# Patient Record
Sex: FEMALE | Race: WHITE | NOT HISPANIC OR LATINO | Employment: PART TIME | URBAN - METROPOLITAN AREA
[De-identification: names, ages, dates, MRNs, and addresses within clinical notes are randomized per-mention and may not be internally consistent; named-entity substitution may affect disease eponyms.]

---

## 2017-11-21 ENCOUNTER — OFFICE VISIT (OUTPATIENT)
Dept: ORTHOPEDIC SURGERY | Facility: CLINIC | Age: 52
End: 2017-11-21

## 2017-11-21 VITALS
WEIGHT: 248 LBS | SYSTOLIC BLOOD PRESSURE: 154 MMHG | BODY MASS INDEX: 42.34 KG/M2 | HEART RATE: 85 BPM | HEIGHT: 64 IN | DIASTOLIC BLOOD PRESSURE: 98 MMHG

## 2017-11-21 DIAGNOSIS — S83.231A COMPLEX TEAR OF MEDIAL MENISCUS OF RIGHT KNEE AS CURRENT INJURY, INITIAL ENCOUNTER: Primary | ICD-10-CM

## 2017-11-21 PROCEDURE — 99204 OFFICE O/P NEW MOD 45 MIN: CPT | Performed by: ORTHOPAEDIC SURGERY

## 2017-11-21 RX ORDER — MONTELUKAST SODIUM 10 MG/1
TABLET ORAL
COMMUNITY
Start: 2017-09-19

## 2017-11-21 RX ORDER — ALBUTEROL SULFATE 2.5 MG/3ML
SOLUTION RESPIRATORY (INHALATION)
COMMUNITY
Start: 2017-09-05

## 2017-11-21 RX ORDER — ALBUTEROL SULFATE 90 UG/1
AEROSOL, METERED RESPIRATORY (INHALATION)
COMMUNITY
Start: 2017-08-31

## 2017-11-21 RX ORDER — CETIRIZINE HYDROCHLORIDE 10 MG/1
TABLET ORAL
COMMUNITY
Start: 2017-09-19

## 2017-12-14 ENCOUNTER — OUTSIDE FACILITY SERVICE (OUTPATIENT)
Dept: ORTHOPEDIC SURGERY | Facility: CLINIC | Age: 52
End: 2017-12-14

## 2017-12-14 PROCEDURE — 29881 ARTHRS KNE SRG MNISECTMY M/L: CPT | Performed by: ORTHOPAEDIC SURGERY

## 2017-12-20 ENCOUNTER — OFFICE VISIT (OUTPATIENT)
Dept: ORTHOPEDIC SURGERY | Facility: CLINIC | Age: 52
End: 2017-12-20

## 2017-12-20 DIAGNOSIS — Z98.890 STATUS POST ARTHROSCOPY OF RIGHT KNEE: ICD-10-CM

## 2017-12-20 DIAGNOSIS — S83.231D COMPLEX TEAR OF MEDIAL MENISCUS OF RIGHT KNEE AS CURRENT INJURY, SUBSEQUENT ENCOUNTER: Primary | ICD-10-CM

## 2017-12-20 PROCEDURE — 99024 POSTOP FOLLOW-UP VISIT: CPT | Performed by: ORTHOPAEDIC SURGERY

## 2017-12-20 NOTE — PROGRESS NOTES
Chief Complaint   Patient presents with   • Post-op     1 week - 12/14/17 Rt knee arthroscopy & partial medial meniscectomy           HPI  She is doing well with no complaints.      There were no vitals filed for this visit.      Physical Exam:    She walks with antalgic gait on crutches.  Range of motion 20-90°.  She has a negative Homans sign.  She is neurovascularly intact        Denise was seen today for post-op.    Diagnoses and all orders for this visit:    Complex tear of medial meniscus of right knee as current injury, subsequent encounter  -     Ambulatory Referral to Physical Therapy    Status post arthroscopy of right knee  -     Ambulatory Referral to Physical Therapy      She will go to physical therapy and follow-up in 3 weeks

## 2018-01-10 ENCOUNTER — OFFICE VISIT (OUTPATIENT)
Dept: ORTHOPEDIC SURGERY | Facility: CLINIC | Age: 53
End: 2018-01-10

## 2018-01-10 DIAGNOSIS — Z98.890 STATUS POST ARTHROSCOPY OF RIGHT KNEE: Primary | ICD-10-CM

## 2018-01-10 DIAGNOSIS — S83.231D COMPLEX TEAR OF MEDIAL MENISCUS OF RIGHT KNEE AS CURRENT INJURY, SUBSEQUENT ENCOUNTER: ICD-10-CM

## 2018-01-10 PROCEDURE — 99024 POSTOP FOLLOW-UP VISIT: CPT | Performed by: ORTHOPAEDIC SURGERY

## 2018-01-10 NOTE — PROGRESS NOTES
Chief Complaint   Patient presents with   • Post-op Follow-up     4 weeks 12/14/17 Rt knee arthroscopy & partial medial meniscectomy           HPI    She's doing well no complaints    There were no vitals filed for this visit.      Physical Exam:    Her right knee looks good portals look good.  Negative Homans sign.        Denise was seen today for post-op follow-up.    Diagnoses and all orders for this visit:    Status post arthroscopy of right knee  -     Ambulatory Referral to Physical Therapy    Complex tear of medial meniscus of right knee as current injury, subsequent encounter  -     Ambulatory Referral to Physical Therapy      She's doing well and will follow up as needed

## 2024-09-04 ENCOUNTER — OFFICE VISIT (OUTPATIENT)
Dept: CARDIOLOGY | Facility: CLINIC | Age: 59
End: 2024-09-04
Payer: MEDICAID

## 2024-09-04 VITALS
BODY MASS INDEX: 45.98 KG/M2 | SYSTOLIC BLOOD PRESSURE: 145 MMHG | HEIGHT: 65 IN | DIASTOLIC BLOOD PRESSURE: 84 MMHG | WEIGHT: 276 LBS | OXYGEN SATURATION: 97 % | HEART RATE: 91 BPM

## 2024-09-04 DIAGNOSIS — R06.02 SHORTNESS OF BREATH: ICD-10-CM

## 2024-09-04 DIAGNOSIS — R42 DIZZINESS: Primary | ICD-10-CM

## 2024-09-04 DIAGNOSIS — R55 PRE-SYNCOPE: ICD-10-CM

## 2024-09-04 PROCEDURE — 99204 OFFICE O/P NEW MOD 45 MIN: CPT | Performed by: PHYSICIAN ASSISTANT

## 2024-09-04 PROCEDURE — 1159F MED LIST DOCD IN RCRD: CPT | Performed by: PHYSICIAN ASSISTANT

## 2024-09-04 PROCEDURE — 1160F RVW MEDS BY RX/DR IN RCRD: CPT | Performed by: PHYSICIAN ASSISTANT

## 2024-09-04 RX ORDER — EPINEPHRINE 0.3 MG/.3ML
0.3 INJECTION SUBCUTANEOUS ONCE
COMMUNITY

## 2024-09-04 NOTE — PROGRESS NOTES
Subjective   Denise Vasques is a 58 y.o. female     Chief Complaint   Patient presents with    [facility]     Cardiac eval - patient reports dizziness, onset was 6/29/24 after flying down to [place], she initially thought it was due to overexertion, had CT And MRI per PCP that showed sinusitis. She reports had gotten new c-pap machine and after wearing it the dizziness resolved. Patient reports was adopted and does not know her family history.     Shortness of Breath     With activity       HPI  The patient presents in the clinic today to establish cardiovascular care.  This very pleasant patient is referred primarily because of dizziness/presyncope.  She has had dizziness for the past 2 to 3 months now.  She reports that this is typically more of a sensation of motion, describing basically vertigo today.  She reports that this tends to be positional, worse when lying flat or with certain motions.  She did see her primary care provider for complaints.  CT and MRI have been performed.  The studies were mostly benign for any significant pathology.  She did apparently had change in CPAP therapy, and reports that now her dizziness has all resolved.  This is followed closely by her pulmonology team.  From general cardiovascular standpoint, she denies chest pain.  Dyspnea is at baseline.  She has rare palpitations but no sustained dysrhythmic activity.  She has no further complaints and feels that she is doing well.  Of note, she has no limitation from cardiovascular standpoint even at moderate levels of exertion.      Current Outpatient Medications   Medication Sig Dispense Refill    cetirizine (zyrTEC) 10 MG tablet Take 1 tablet by mouth Daily As Needed for Allergies.      EPINEPHrine (EpiPen 2-Chance) 0.3 MG/0.3ML solution auto-injector injection Inject 0.3 mL under the skin into the appropriate area as directed 1 (One) Time.      VENTOLIN  (90 Base) MCG/ACT inhaler        No current facility-administered  "medications for this visit.       Alpha-gal and Penicillins    Past Medical History:   Diagnosis Date    Allergy to alpha-gal     Asthma     Sleep apnea        Social History     Socioeconomic History    Marital status:    Tobacco Use    Smoking status: Never    Smokeless tobacco: Never   Substance and Sexual Activity    Alcohol use: Yes     Alcohol/week: 2.0 standard drinks of alcohol     Types: 1 Shots of liquor, 1 Drinks containing 0.5 oz of alcohol per week     Comment: occasional    Drug use: No    Sexual activity: Yes     Partners: Male     Birth control/protection: Hysterectomy       Family History   Problem Relation Age of Onset    No Known Problems Mother     No Known Problems Father     Cancer Sister        Review of Systems   Constitutional:  Positive for diaphoresis. Negative for chills, fatigue and fever.   Eyes: Negative.  Negative for visual disturbance.   Respiratory:  Positive for apnea and shortness of breath. Negative for cough, chest tightness and wheezing.    Cardiovascular:  Positive for leg swelling. Negative for chest pain and palpitations.   Gastrointestinal:  Negative for abdominal pain and blood in stool.   Endocrine: Negative.    Genitourinary: Negative.  Negative for hematuria.   Musculoskeletal:  Positive for back pain. Negative for arthralgias, myalgias, neck pain and neck stiffness.   Skin: Negative.  Negative for rash and wound.   Allergic/Immunologic: Positive for environmental allergies (cats, pollen , grass, trees) and food allergies (alpha gal).   Neurological:  Positive for dizziness and headaches. Negative for syncope, weakness, light-headedness and numbness.   Hematological: Negative.  Does not bruise/bleed easily.   Psychiatric/Behavioral:  Positive for sleep disturbance (sleep apnea).        Objective     Vitals:    09/04/24 1436   BP: 145/84   Pulse: 91   SpO2: 97%   Weight: 125 kg (276 lb)   Height: 165.1 cm (65\")        /84   Pulse 91   Ht 165.1 cm (65\")   " Wt 125 kg (276 lb)   SpO2 97%   BMI 45.93 kg/m²      Lab Results (most recent)       None            Physical Exam  Vitals and nursing note reviewed.   Constitutional:       General: She is not in acute distress.     Appearance: She is well-developed.   HENT:      Head: Normocephalic and atraumatic.   Eyes:      Conjunctiva/sclera: Conjunctivae normal.      Pupils: Pupils are equal, round, and reactive to light.   Neck:      Vascular: No JVD.      Trachea: No tracheal deviation.   Cardiovascular:      Rate and Rhythm: Normal rate and regular rhythm.      Heart sounds: Normal heart sounds.   Pulmonary:      Effort: Pulmonary effort is normal.      Breath sounds: Normal breath sounds.   Abdominal:      General: Bowel sounds are normal. There is no distension.      Palpations: Abdomen is soft. There is no mass.      Tenderness: There is no abdominal tenderness. There is no guarding or rebound.   Musculoskeletal:         General: No tenderness or deformity. Normal range of motion.      Cervical back: Normal range of motion and neck supple.   Skin:     General: Skin is warm and dry.      Coloration: Skin is not pale.      Findings: No erythema or rash.   Neurological:      Mental Status: She is alert and oriented to person, place, and time.   Psychiatric:         Behavior: Behavior normal.         Thought Content: Thought content normal.         Judgment: Judgment normal.         Procedure   Procedures         Assessment & Plan      Diagnosis Plan   1. Dizziness  Adult Transthoracic Echo Complete W/ Cont if Necessary Per Protocol      2. Shortness of breath  Adult Transthoracic Echo Complete W/ Cont if Necessary Per Protocol      3. Pre-syncope  Adult Transthoracic Echo Complete W/ Cont if Necessary Per Protocol        1.  The patient presents primarily for evaluation of symptoms.  She is most concerned with dizziness and history of presyncope.  I do not feel this is likely cardiac related.  She describes mostly  vertigo.  Still, cardiac evaluation has been recommended.    2.  We will schedule for an echo just to evaluate cardiac structure.  If normal, I would not feel further evaluation would be indicated.    3.  If symptoms were to persist, I would consider event monitor and further evaluation otherwise.  She now reports that she is mostly asymptomatic.  We will monitor clinical course and correlate echo findings accordingly.    4.  No adjustments of medications will be made.  If echo findings are unremarkable, we can see the patient back as needed.                 Electronically signed by:

## 2024-10-09 ENCOUNTER — HOSPITAL ENCOUNTER (OUTPATIENT)
Dept: CARDIOLOGY | Facility: HOSPITAL | Age: 59
Discharge: HOME | End: 2024-10-09
Admitting: PHYSICIAN ASSISTANT
Payer: MEDICAID

## 2024-10-09 VITALS — BODY MASS INDEX: 45.91 KG/M2 | WEIGHT: 275.57 LBS | HEIGHT: 65 IN

## 2024-10-09 DIAGNOSIS — R06.02 SHORTNESS OF BREATH: ICD-10-CM

## 2024-10-09 DIAGNOSIS — R42 DIZZINESS: ICD-10-CM

## 2024-10-09 DIAGNOSIS — R55 PRE-SYNCOPE: ICD-10-CM

## 2024-10-09 PROCEDURE — 93306 TTE W/DOPPLER COMPLETE: CPT

## 2024-10-13 LAB
AORTIC DIMENSIONLESS INDEX: 0.77 (DI)
BH CV ECHO MEAS - ACS: 1.57 CM
BH CV ECHO MEAS - AO MAX PG: 8.1 MMHG
BH CV ECHO MEAS - AO MEAN PG: 4.6 MMHG
BH CV ECHO MEAS - AO ROOT DIAM: 2.9 CM
BH CV ECHO MEAS - AO V2 MAX: 142 CM/SEC
BH CV ECHO MEAS - AO V2 VTI: 29.6 CM
BH CV ECHO MEAS - EDV(CUBED): 72.8 ML
BH CV ECHO MEAS - EF(MOD-BP): 56 %
BH CV ECHO MEAS - ESV(CUBED): 25.9 ML
BH CV ECHO MEAS - FS: 29.2 %
BH CV ECHO MEAS - IVS/LVPW: 0.73 CM
BH CV ECHO MEAS - IVSD: 0.88 CM
BH CV ECHO MEAS - LA DIMENSION: 3.4 CM
BH CV ECHO MEAS - LAT PEAK E' VEL: 7.7 CM/SEC
BH CV ECHO MEAS - LV MASS(C)D: 143.1 GRAMS
BH CV ECHO MEAS - LV MAX PG: 4 MMHG
BH CV ECHO MEAS - LV MEAN PG: 2.15 MMHG
BH CV ECHO MEAS - LV V1 MAX: 100.3 CM/SEC
BH CV ECHO MEAS - LV V1 VTI: 22.9 CM
BH CV ECHO MEAS - LVIDD: 4.2 CM
BH CV ECHO MEAS - LVIDS: 3 CM
BH CV ECHO MEAS - LVPWD: 1.2 CM
BH CV ECHO MEAS - MED PEAK E' VEL: 8 CM/SEC
BH CV ECHO MEAS - MV A MAX VEL: 94.2 CM/SEC
BH CV ECHO MEAS - MV DEC SLOPE: 481.7 CM/SEC2
BH CV ECHO MEAS - MV DEC TIME: 0.2 SEC
BH CV ECHO MEAS - MV E MAX VEL: 84.8 CM/SEC
BH CV ECHO MEAS - MV E/A: 0.9
BH CV ECHO MEAS - MV MAX PG: 3.7 MMHG
BH CV ECHO MEAS - MV MEAN PG: 1.72 MMHG
BH CV ECHO MEAS - MV P1/2T: 61.1 MSEC
BH CV ECHO MEAS - MV V2 VTI: 30.3 CM
BH CV ECHO MEAS - MVA(P1/2T): 3.6 CM2
BH CV ECHO MEAS - PA V2 MAX: 106.3 CM/SEC
BH CV ECHO MEAS - RAP SYSTOLE: 8 MMHG
BH CV ECHO MEAS - RV MAX PG: 1.49 MMHG
BH CV ECHO MEAS - RV V1 MAX: 61 CM/SEC
BH CV ECHO MEAS - RV V1 VTI: 12.9 CM
BH CV ECHO MEAS - RVDD: 3.2 CM
BH CV ECHO MEAS - RVSP: 21.8 MMHG
BH CV ECHO MEAS - TAPSE (>1.6): 2.6 CM
BH CV ECHO MEAS - TR MAX PG: 13.8 MMHG
BH CV ECHO MEAS - TR MAX VEL: 185.5 CM/SEC
BH CV ECHO MEASUREMENTS AVERAGE E/E' RATIO: 10.8
BH CV XLRA - TDI S': 14.7 CM/SEC
SINUS: 2.9 CM

## 2024-10-14 ENCOUNTER — TELEPHONE (OUTPATIENT)
Dept: CARDIOLOGY | Facility: CLINIC | Age: 59
End: 2024-10-14
Payer: MEDICAID

## 2024-10-14 NOTE — TELEPHONE ENCOUNTER
ECHO  Pt notified of no acute findings. Provider will discuss results at f/u. Pt reminded of appt date and time.  ----- Message from Gorge Arriaza sent at 10/13/2024 11:17 PM EDT -----  Routine follow-up.  ----- Message -----  From: Justin Varela MD  Sent: 10/13/2024   2:57 PM EDT  To: FRANCIE Edge

## 2025-06-04 ENCOUNTER — OFFICE VISIT (OUTPATIENT)
Dept: CARDIOLOGY | Facility: CLINIC | Age: 60
End: 2025-06-04
Payer: COMMERCIAL

## 2025-06-04 VITALS
HEIGHT: 65 IN | DIASTOLIC BLOOD PRESSURE: 83 MMHG | SYSTOLIC BLOOD PRESSURE: 131 MMHG | BODY MASS INDEX: 45.48 KG/M2 | OXYGEN SATURATION: 95 % | HEART RATE: 94 BPM | WEIGHT: 273 LBS

## 2025-06-04 DIAGNOSIS — R55 PRE-SYNCOPE: ICD-10-CM

## 2025-06-04 DIAGNOSIS — R42 DIZZINESS: Primary | ICD-10-CM

## 2025-06-04 DIAGNOSIS — R06.02 SHORTNESS OF BREATH: ICD-10-CM

## 2025-06-04 PROCEDURE — 99213 OFFICE O/P EST LOW 20 MIN: CPT | Performed by: PHYSICIAN ASSISTANT

## 2025-06-04 NOTE — PROGRESS NOTES
Problem list     Subjective   Denise Vasques is a 59 y.o. female     Chief Complaint   Patient presents with    Follow-up     Echo        HPI  The patient presents in the clinic today for follow-up.  The patient was seen historically through the clinic because of dizziness.  This was mostly vertigo by description.  She had CT and MRI studies through her primary care which was unremarkable.  She apparently had change in CPAP therapy at some point before being evaluated here and all of her dizziness resolved.  Still, we were asked to see her and evaluate her.  We did get an echo.  This indicated normal systolic function with no significant valvular or structural abnormalities.  She currently has no chest pain.  Dyspnea is at baseline.  She has no dysrhythmic symptoms.  She has no further complaints and feels that she is doing well.    Current Outpatient Medications on File Prior to Visit   Medication Sig Dispense Refill    EPINEPHrine (EpiPen 2-Chance) 0.3 MG/0.3ML solution auto-injector injection Inject 0.3 mL under the skin into the appropriate area as directed 1 (One) Time.      VENTOLIN  (90 Base) MCG/ACT inhaler       [DISCONTINUED] cetirizine (zyrTEC) 10 MG tablet Take 1 tablet by mouth Daily As Needed for Allergies.       No current facility-administered medications on file prior to visit.       Alpha-gal and Penicillins    Past Medical History:   Diagnosis Date    Allergy to alpha-gal     Asthma     Sleep apnea        Social History     Socioeconomic History    Marital status:    Tobacco Use    Smoking status: Never    Smokeless tobacco: Never   Vaping Use    Vaping status: Never Used   Substance and Sexual Activity    Alcohol use: Yes     Alcohol/week: 2.0 standard drinks of alcohol     Types: 1 Shots of liquor, 1 Drinks containing 0.5 oz of alcohol per week     Comment: occasional    Drug use: Never    Sexual activity: Yes     Partners: Male     Birth control/protection: Hysterectomy       Family  "History   Problem Relation Age of Onset    No Known Problems Mother     No Known Problems Father     Cancer Sister        Review of Systems   Constitutional:  Positive for diaphoresis. Negative for chills, fatigue and fever.   HENT: Negative.  Negative for hearing loss.    Eyes: Negative.  Negative for visual disturbance.   Respiratory:  Positive for apnea. Negative for cough, chest tightness, shortness of breath and wheezing.    Cardiovascular: Negative.  Negative for chest pain, palpitations and leg swelling.   Gastrointestinal: Negative.  Negative for abdominal pain and blood in stool.   Endocrine: Negative.    Genitourinary: Negative.  Negative for hematuria.   Musculoskeletal:  Positive for arthralgias, back pain, myalgias, neck pain and neck stiffness.   Skin: Negative.  Negative for rash and wound.   Allergic/Immunologic: Positive for environmental allergies (seasonal) and food allergies (alpha gal).   Neurological: Negative.  Negative for dizziness, syncope, weakness, light-headedness, numbness and headaches.   Hematological: Negative.  Does not bruise/bleed easily.   Psychiatric/Behavioral:  Positive for sleep disturbance (sleep apnea).        Objective   Vitals:    06/04/25 1515   BP: 131/83   Pulse: 94   SpO2: 95%   Weight: 124 kg (273 lb)   Height: 165.1 cm (65\")      /83   Pulse 94   Ht 165.1 cm (65\")   Wt 124 kg (273 lb)   SpO2 95%   BMI 45.43 kg/m²    Lab Results (most recent)       None          Physical Exam  Vitals and nursing note reviewed.   Constitutional:       General: She is not in acute distress.     Appearance: She is well-developed.   HENT:      Head: Normocephalic and atraumatic.   Eyes:      Conjunctiva/sclera: Conjunctivae normal.      Pupils: Pupils are equal, round, and reactive to light.   Neck:      Vascular: No JVD.      Trachea: No tracheal deviation.   Cardiovascular:      Rate and Rhythm: Normal rate and regular rhythm.      Heart sounds: Normal heart sounds. "   Pulmonary:      Effort: Pulmonary effort is normal.      Breath sounds: Normal breath sounds.   Abdominal:      General: Bowel sounds are normal. There is no distension.      Palpations: Abdomen is soft. There is no mass.      Tenderness: There is no abdominal tenderness. There is no guarding or rebound.   Musculoskeletal:         General: No tenderness or deformity. Normal range of motion.      Cervical back: Normal range of motion and neck supple.   Skin:     General: Skin is warm and dry.      Coloration: Skin is not pale.      Findings: No erythema or rash.   Neurological:      Mental Status: She is alert and oriented to person, place, and time.   Psychiatric:         Behavior: Behavior normal.         Thought Content: Thought content normal.         Judgment: Judgment normal.           Procedure   Procedures       Assessment & Plan      Diagnosis Plan   1. Dizziness        2. Shortness of breath        3. Pre-syncope          1.  At this time, the patient appears to be doing well.  Dizziness and presyncopal episodes have resolved.  Dyspnea remains at baseline.  She has no further complaints.    2.  We did schedule for an echo because of clinical scenario.  Echo indicated normal systolic function with no significant valvular or structural abnormalities.  As the patient is now doing well, no further evaluation would be indicated.    3.  I would make no adjustments in medications at this time.    4.  We will see her now on a yearly evaluation.  She will return for any issues.           Denise Caprice  reports that she has never smoked. She has never used smokeless tobacco.         Electronically signed by: